# Patient Record
Sex: MALE | Race: BLACK OR AFRICAN AMERICAN | NOT HISPANIC OR LATINO | Employment: UNEMPLOYED | URBAN - METROPOLITAN AREA
[De-identification: names, ages, dates, MRNs, and addresses within clinical notes are randomized per-mention and may not be internally consistent; named-entity substitution may affect disease eponyms.]

---

## 2024-02-14 ENCOUNTER — CLINICAL SUPPORT (OUTPATIENT)
Dept: AUDIOLOGY | Facility: CLINIC | Age: 89
End: 2024-02-14
Payer: COMMERCIAL

## 2024-02-14 ENCOUNTER — OFFICE VISIT (OUTPATIENT)
Dept: OTOLARYNGOLOGY | Facility: CLINIC | Age: 89
End: 2024-02-14
Payer: COMMERCIAL

## 2024-02-14 DIAGNOSIS — H90.3 SENSORINEURAL HEARING LOSS (SNHL) OF BOTH EARS: Primary | ICD-10-CM

## 2024-02-14 DIAGNOSIS — H61.23 BILATERAL IMPACTED CERUMEN: Primary | ICD-10-CM

## 2024-02-14 PROCEDURE — 69210 REMOVE IMPACTED EAR WAX UNI: CPT | Performed by: OTOLARYNGOLOGY

## 2024-02-14 PROCEDURE — 92550 TYMPANOMETRY & REFLEX THRESH: CPT | Performed by: AUDIOLOGIST

## 2024-02-14 PROCEDURE — 99203 OFFICE O/P NEW LOW 30 MIN: CPT | Performed by: OTOLARYNGOLOGY

## 2024-02-14 PROCEDURE — 92557 COMPREHENSIVE HEARING TEST: CPT | Performed by: AUDIOLOGIST

## 2024-02-14 NOTE — PROGRESS NOTES
AUDIOLOGY ADULT AUDIOMETRIC EVALUATION    Name:  Albert Lopez  :  1931  Age:  89 y.o.  Date of Evaluation:  2024    Reason for visit: Mr. Lopez is seen in the clinic today at the request of otolaryngology for an audiologic evaluation.     HISTORY  Patient  came with his care giver and complains of very poor hearing long term and is also seated in a wheel chair.      EVALUATION  See scanned audiogram: “Media” > “Audiology Report”.      RESULTS  Otoscopic Evaluation:  Right Ear: clear ear canal  Left Ear: clear ear canal    Immittance Measures:  Tympanometry:  Right Ear: Type A, normal tympanic membrane mobility with normal middle ear pressure   Left Ear: Type A, normal tympanic membrane mobility with normal middle ear pressure     Acoustic Reflexes:  Ipsilateral Right Ear: NR NR NR NR  Ipsilateral Left Ear:   NR NR NR NRN  Contralateral Right Ear: did not evaluate  Contralateral Left Ear: did not evaluate    Distortion Product Otoacoustic Emissions (DPOAEs):  Right Ear: DNT  Left Ear:   DNT    Audiometry:  Test Technique and Reliability: BEHAVIORAL   Standard audiometry via supra-aural headphones. Reliability is FAIR.    Pure tone air and bone conduction audiometry:  Right Ear: SEVERE TO PROFOUND SNHL WITH POOR WRS.   Left Ear: SEVERE TO PROFOUND SNHL WITH POOR WRS.     Speech Audiometry (Word Recognition Scores):   Right Ear:  36% POOR  Left Ear:     44 % POOR    IMPRESSIONS  SVERE TO PROFOUND SNHL IN BOTH EARS.  The presence of acoustic reflexes within normal intensity limits is consistent with normal middle ear and brainstem function, and suggests that auditory sensitivity is not significantly impaired. An elevated or absent acoustic reflex threshold is consistent with a middle ear disorder, hearing loss in the stimulated ear, and/or interruption of neural innervation of the stapedius muscle. Present DPOAEs suggest normal/near normal cochlear outer hair cell function and are consistent  with no greater than a mild hearing loss at those frequencies. Absent DPOAEs are consistent with abnormal cochlear outer hair cell function and some degree of hearing loss at those frequencies.    RECOMMENDATIONS  - Follow up with otolaryngology today as scheduled.  - Audiologic evaluation as needed.  - Annual audiologic evaluation, sooner if an acute change is noted.  - Audiologic evaluation in conjunction with otologic care, if an acute change is noted, and/or annually.  - Consider hearing aids. TOLD CAREGIVER THAT A HEARING AID CONSULT OR A CI CONSULT MAY BENEFIT PATIENT. Contact insurance to determine if there is an applicable benefit and where it can be used. Contact our office to schedule an appointment should he wish to proceed with hearing aids through our clinic.  - Consider hearing aids. Contact insurance to determine if there is an applicable benefit and where it can be used.  - CI CONSULT AS DESIRED IF INDICATED.  - Follow-up with audiology annually for routine hearing aid maintenance, sooner if questions/problems arise.  - Follow-up with medical care team as planned.    PATIENT EDUCATION  Discussed results, impressions and recommendations with the patient. Questions were addressed and the patient was encouraged to contact our office should concerns arise.    Time for this encounter: 55 MINUTES PATIENT/S AGE AND USE OF RAMP AND WHEELCHAIR TOOK MORE TIME THAN SCHEDULED.    Kayden Corona  Licensed Audiologist

## 2024-02-14 NOTE — PROGRESS NOTES
"History Of Present Illness  Albert Lopez is a 89 y.o. male presenting with: \"Ear and throat\".  He is kindly referred by Luis Jose, DO    He has decreased hearing.  Earwax was cleaned bilaterally.  Tympanic membranes look intact on examination.  His hearing test shows bilateral severe to profound hearing loss with type a tympanograms.  Speech discrimination is 38% at the right ear and 44% at left ear.  Reliability of the test was fair.    We can either try hearing aids, or choose to go for cochlear implant.  Due to his age I think trying hearing aids as a step will be a better option.     Past Medical History  He has no past medical history on file.    Surgical History  He has no past surgical history on file.     Social History  He has no history on file for tobacco use, alcohol use, and drug use.    Family History  No family history on file.     Allergies  Patient has no allergy information on record.    Review of Systems   None listed     Physical Exam    General appearance: Healthy-appearing, well-nourished, well groomed, in no acute distress.     Head and Face: Atraumatic with no masses, lesions, or scarring.      Salivary glands: No tenderness of the parotid glands or parotid masses.     No tenderness of the submandibular glands or submandibular masses.      Facial strength: Normal strength and symmetry, no synkinesis or facial tic.     Eyes: Conjunctivas look non-hyperemic bilaterally    Ears: Bilaterally ear canals look normal. Tympanic membranes look intact, no hyperemia, fluid or retraction. Hearing grossly normal.      Nose: Mucosa looks normal. No purulent discharge. Septum essentially straight.     Oral Cavity/Mouth: Lips and tongue look normal.     Throat: No postnasal discharge. No tonsil hypertrophy. No hyperemia.    Neck: Symmetrical, trachea midline.     Pulmonary: Normal respiratory effort.     Lymphatic: No palpable pathologic lymph nodes at neck.     Neurological/Psychiatric " "Orientation to person, place, and time: Normal.     Mood and affect: Normal.      Extremities: No clubbing.     Skin: No significant skin lesions were noted at face or neck        Procedure    EAR WAX REMOVAL 02.14.2024  Patient had bilateral ear wax. Using small instrument(s) and/or suction cleaning was done. Patient tolerated the procedure well.        Last Recorded Vitals  There were no vitals taken for this visit.    Relevant Results  Assessment and Plan:  Albert Lopez is a 89 y.o. male presenting with: \"Ear and throat\".  He is kindly referred by uLis Jose, DO    He has decreased hearing.  Earwax was cleaned bilaterally.  Tympanic membranes look intact on examination.  His hearing test shows bilateral severe to profound hearing loss with type a tympanograms.  Speech discrimination is 38% at the right ear and 44% at left ear.  Reliability of the test was fair.    We can either try hearing aids, or choose to go for cochlear implant.  Due to his age I think trying hearing aids as a step will be a better option.    Danisha Falcon  Otolaryngology - Head & Neck Surgery  "